# Patient Record
Sex: MALE | Race: WHITE
[De-identification: names, ages, dates, MRNs, and addresses within clinical notes are randomized per-mention and may not be internally consistent; named-entity substitution may affect disease eponyms.]

---

## 2017-07-12 ENCOUNTER — HOSPITAL ENCOUNTER (OUTPATIENT)
Dept: HOSPITAL 62 - CAR | Age: 27
End: 2017-07-12
Attending: FAMILY MEDICINE
Payer: MEDICAID

## 2017-07-12 DIAGNOSIS — K90.0: Primary | ICD-10-CM

## 2017-07-12 DIAGNOSIS — Z79.899: ICD-10-CM

## 2017-07-12 DIAGNOSIS — L30.9: ICD-10-CM

## 2017-07-12 LAB
ALBUMIN SERPL-MCNC: 4.5 G/DL (ref 3.5–5)
ALP SERPL-CCNC: 91 U/L (ref 38–126)
ALT SERPL-CCNC: 41 U/L (ref 21–72)
ANION GAP SERPL CALC-SCNC: 14 MMOL/L (ref 5–19)
AST SERPL-CCNC: 30 U/L (ref 17–59)
BASOPHILS # BLD AUTO: 0 10^3/UL (ref 0–0.2)
BASOPHILS NFR BLD AUTO: 1 % (ref 0–2)
BILIRUB DIRECT SERPL-MCNC: 0.3 MG/DL (ref 0–0.4)
BILIRUB SERPL-MCNC: 0.9 MG/DL (ref 0.2–1.3)
BUN SERPL-MCNC: 19 MG/DL (ref 7–20)
CALCIUM: 9.1 MG/DL (ref 8.4–10.2)
CHLORIDE SERPL-SCNC: 102 MMOL/L (ref 98–107)
CHOLEST SERPL-MCNC: 202.34 MG/DL (ref 0–200)
CO2 SERPL-SCNC: 28 MMOL/L (ref 22–30)
CREAT SERPL-MCNC: 0.85 MG/DL (ref 0.52–1.25)
DIRECT HDL: 43 MG/DL (ref 40–?)
EOSINOPHIL # BLD AUTO: 0 10^3/UL (ref 0–0.6)
EOSINOPHIL NFR BLD AUTO: 0.9 % (ref 0–6)
ERYTHROCYTE [DISTWIDTH] IN BLOOD BY AUTOMATED COUNT: 13.5 % (ref 11.5–14)
GLUCOSE SERPL-MCNC: 85 MG/DL (ref 75–110)
HCT VFR BLD CALC: 48.3 % (ref 37.9–51)
HGB BLD-MCNC: 15.8 G/DL (ref 13.5–17)
HGB HCT DIFFERENCE: -0.9
LDLC SERPL DIRECT ASSAY-MCNC: 148 MG/DL (ref ?–100)
LYMPHOCYTES # BLD AUTO: 1 10^3/UL (ref 0.5–4.7)
LYMPHOCYTES NFR BLD AUTO: 21.8 % (ref 13–45)
MCH RBC QN AUTO: 33.2 PG (ref 27–33.4)
MCHC RBC AUTO-ENTMCNC: 32.6 G/DL (ref 32–36)
MCV RBC AUTO: 102 FL (ref 80–97)
MONOCYTES # BLD AUTO: 0.5 10^3/UL (ref 0.1–1.4)
MONOCYTES NFR BLD AUTO: 11.1 % (ref 3–13)
NEUTROPHILS # BLD AUTO: 3 10^3/UL (ref 1.7–8.2)
NEUTS SEG NFR BLD AUTO: 65.2 % (ref 42–78)
POTASSIUM SERPL-SCNC: 4.5 MMOL/L (ref 3.6–5)
PROT SERPL-MCNC: 8.2 G/DL (ref 6.3–8.2)
RBC # BLD AUTO: 4.76 10^6/UL (ref 4.35–5.55)
SODIUM SERPL-SCNC: 144.1 MMOL/L (ref 137–145)
TRIGL SERPL-MCNC: 65 MG/DL (ref ?–150)
VLDLC SERPL CALC-MCNC: 13 MG/DL (ref 10–31)
WBC # BLD AUTO: 4.6 10^3/UL (ref 4–10.5)

## 2017-07-12 PROCEDURE — 85025 COMPLETE CBC W/AUTO DIFF WBC: CPT

## 2017-07-12 PROCEDURE — 84443 ASSAY THYROID STIM HORMONE: CPT

## 2017-07-12 PROCEDURE — 80061 LIPID PANEL: CPT

## 2017-07-12 PROCEDURE — 80053 COMPREHEN METABOLIC PANEL: CPT

## 2017-07-12 PROCEDURE — 82306 VITAMIN D 25 HYDROXY: CPT

## 2017-08-25 ENCOUNTER — HOSPITAL ENCOUNTER (OUTPATIENT)
Dept: HOSPITAL 62 - PC | Age: 27
End: 2017-08-25
Attending: PEDIATRICS
Payer: MEDICAID

## 2017-08-25 DIAGNOSIS — I34.0: Primary | ICD-10-CM

## 2017-08-25 DIAGNOSIS — Q90.9: ICD-10-CM

## 2017-08-25 PROCEDURE — 93005 ELECTROCARDIOGRAM TRACING: CPT

## 2017-08-25 PROCEDURE — 93325 DOPPLER ECHO COLOR FLOW MAPG: CPT

## 2017-08-25 PROCEDURE — 93320 DOPPLER ECHO COMPLETE: CPT

## 2017-08-25 PROCEDURE — 94760 N-INVAS EAR/PLS OXIMETRY 1: CPT

## 2017-08-25 PROCEDURE — 93303 ECHO TRANSTHORACIC: CPT

## 2017-08-25 PROCEDURE — 93010 ELECTROCARDIOGRAM REPORT: CPT

## 2017-08-25 NOTE — NONINVASIVE CARDIOLOGY REPORT
ECHOCARDIOGRAPHY REPORT



PATIENT NAME:  DUGLAS REBOLLEDO

MRN:  [id number]        ACCT#:  [id number]  ROOM#:

DATE OF SERVICE:  2017                    :  1990

PRIMARY CARE:  Dr. Leanna Reinoso

[facility] REFERENCE #:  [id number]

ORDER #:  [id number]

INDICATION:  Follow up of mitral regurgitation.



WEIGHT:  121 pounds

HEIGHT:  61 inches



REPORT

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

regurgitation but no left atrial enlargement.  Left ventricular size and

performance are normal.  LV wall thickness and septal thickness are

normal.  Aortic root normal.  Aortic valve trileaflet and normal.  No

endocardial cushion defect.  Atrial septum appears intact.  Aortic arch is

normal.



Doppler velocities are normal through the four cardiac valves.  Tricuspid

regurgitant velocity indicates no pulmonary hypertension.  Descending

aorta velocity normal.



Color mapping shows normal TR, normal TN, and the mild mitral

regurgitation.



CARDIAC DIMENSIONS:  LVED 4.7 cm, LVES 3.2 cm, LV wall 0.8 cm, septum 0.9

cm, aorta 2.8 cm, right ventricle 1.5 cm, left atrium 3.3 cm.



DOPPLER VELOCITIES:  Aorta 1.1 m/sec, pulmonary 0.7 m/sec, tricuspid 0.5

m/sec, mitral 1.2 m/sec, tricuspid regurgitation 2.2 m/sec, descending

aorta 1.1 m/sec.



FINAL IMPRESSION:  MILD MITRAL VALVE REGURGITATION THROUGH A CLEFT IN THE

ANTERIOR MITRAL LEAFLET WITHOUT CHANGE AND WITHOUT LEFT ATRIAL OR LEFT

VENTRICULAR ENLARGEMENT.



INTERPRETING PHYSICIAN: KEELY OLMEDO MD









/:  [id number]      DT:  2017 TT:  1424      ID:  [id number]

/:  [id number]      DD:  2017 TD:  1305     JOB:  [id number]



cc:MD LEANNA FELIX M.D.

>

## 2017-08-25 NOTE — [facility]



NAME: DUGLAS REBOLLEDO

MRN:  [id number]

[facility] REFERENCE #:  [id number]

:  1990

DATE OF VISIT:  2017



PRIMARY CARE:  Dr. Leanna Reinoso



CHIEF COMPLAINT:  Down syndrome with mitral valve disorder.



Patient returns for his two-year followup.  He has had mild mitral valve

regurgitation from a cleft in the anterior leaflet.  He has Down syndrome

and autism.  He lives at [facility].  He is here with his two [employer]

workers today.  They state that he is doing well.  He is always happy.  He

has no unusual respiratory or cardiac distress.  He does not have syncope

or seizures.



MEDICATIONS:  Allegra, vitamin D, omega 3 fatty acids, and Benofiber.



ALLERGIES TO MEDICATION:  PENICILLIN.



SOCIAL HISTORY:  Lives at [facility].



PAST MEDICAL HISTORY:  Down syndrome, surgery for Hirschsprung's disease

and intussusception and partial resection of small bowel in ,

tympanostomy tubes at age [age], inguinal herniorrhaphy .



REVIEW OF SYSTEMS:  Our 12-point review was negative for any symptoms.  He

does see the dentist at least twice a year and gets good checkups.



PHYSICAL EXAMINATION:  Weight 121 pounds, height 61 inches, oximetry 100%,

blood pressure 106/63, heart rate 65.  General exam is a happy and

reasonably cooperative boy with autism and Down syndrome.  He is fit

appearing and not obese.  Color and perfusion excellent.  Lungs clear

bilateral.  Precordial activity normal.  Cardiac auscultation reveals a

grade 2-3 holosystolic mitral regurgitation murmur with a quiet second

heart sound.  No gallop.  No diastolic murmur.  Abdomen shows scars from

his surgery, but is nontender.  Pulses are brisk.



Twelve-lead electrocardiogram is normal with all normal intervals. 

Echocardiogram performed.



IMPRESSION:  HE HAS MILD MITRAL VALVE REGURGITATION THROUGH A CLEFT IN THE

ANTERIOR MITRAL VALVE LEAFLET.  HE DOES NOT HAVE A TRUE AV CANAL.  HIS

LEFT VENTRICLE AND LEFT ATRIUM ARE NORMAL SIZE WITH NORMAL PERFORMANCE. 

He does not need antibiotic prophylaxis for dental cleanings.  He does not

need cardiac medications.  He does not require cardiac surgery at this

time.  Recommend a two-year cardiac echo and visit.



KEELY OLMEDO MD









[id number]                  DT: 2017    1323

PHY#: [id number]            DD: 2017    1303

ID:   [id number]           JOB#: [id number]       ACCT: [id number]



cc:MD LEANNA FELIX M.D.

>

## 2019-09-13 ENCOUNTER — HOSPITAL ENCOUNTER (OUTPATIENT)
Dept: HOSPITAL 62 - PC | Age: 29
End: 2019-09-13
Attending: PEDIATRICS
Payer: MEDICAID

## 2019-09-13 DIAGNOSIS — Z51.89: Primary | ICD-10-CM

## 2019-09-13 DIAGNOSIS — Q90.9: ICD-10-CM

## 2019-09-13 DIAGNOSIS — I34.0: ICD-10-CM

## 2019-09-13 PROCEDURE — 94760 N-INVAS EAR/PLS OXIMETRY 1: CPT

## 2019-09-13 PROCEDURE — 93325 DOPPLER ECHO COLOR FLOW MAPG: CPT

## 2019-09-13 PROCEDURE — 93304 ECHO TRANSTHORACIC: CPT

## 2019-09-13 PROCEDURE — 93321 DOPPLER ECHO F-UP/LMTD STD: CPT

## 2019-09-14 NOTE — PEDIATRIC CLINIC REPORT
Pediatric Cardiology Clinic


Pediatric Cardiology Clinic Note: 


[facility] Note [facility]


Date: Date of visit September 13, 2019


Reason for Visit/ Chief Complaint: Follow-up of mitral valve regurgitation 

associated with Down syndrome


Requesting Source: PCP: Physicians at [facility] in [place]; Dr Leanna Reinoso


Pediatric Cardiologist: Cullen Castorena MD, [facility] [employer] 

[facility] Pediatric Cardiology





History of Present Illness and Cardiology History: He is at our pediatric 

cardiology outreach clinic at [facility] with to the [facility] workers.  I

last saw him 2 years ago for his mild mitral valve regurgitation associated with

a cleft mitral valve.  He has Down syndrome.  He is doing well with his general 

physical health.  His only behavioral medications Intuniv 0.5 mg daily.  He is 

not on cardiac medications.  He does not require an asthma inhaler and simply 

has medication for seasonal allergies.


No cardiovascular symptoms.


No chest pain or palpitations. No respiratory complaints such as wheezing or 

apparent dyspnea. Denies exercise intolerance.


The medications list was reviewed with the patient.


Allergies were reviewed with the patient.


Allergies Reported: Penicillin allergy


Medical History: Down syndrome,Celiac disease, eczema, history of Hirschsprung 

disease,


Surgical History: Surgery for Hirschsprung's disease intussusception with 

partial resection of small bowel in 1999.  Tympanostomy tubes at age 2.  

Inguinal herniorrhaphy in 2019.





Social History:  He lives at [facility]





Review of Systems


General: Denies fevers, unusual sweats, anorexia, unusual fatigue, abnormal 

weight loss, developmental delays.


Eyes: Denies vision change or problems


Ears/Nose/Throat:Denies decreased hearing, or acute symptoms


Cardiovascular: see HPI


Respiratory:Denies cough, dyspnea, wheezing, snoring.


Gastrointestinal:Denies nausea, vomiting, diarrhea, constipation, abdominal 

pain.


Genitourinary:Denies dysuria, urinary frequency


GYN: Denies abnormal vaginal bleeding.


Musculoskeletal: Denies back pain, joint pain, or unusual joint laxity.


Skin: Denies rash


Neurologic: Denies seizures, syncope, or frequent headache.


Psychiatric: Denies complaints.


Endocrine: Denies symptoms or unusual weight change.


Heme/Lymphatic: Denies abnormal bruising, bleeding, enlarged lymph nodes.





Physical Exam


Vital Signs: Oximetry 100%


Weight: 131 pounds           height: 62 inches


Pulse rate: 75     respirations: 20


Blood Pressure: 110/70


Growth: appropriate, short stature of Down syndrome.


General appearance: alert, well nourished, well hydrated, no acute distress


Head: normocephalic with facial features of trisomy 21.


Eyes: conjunctivae and lids normal


Teeth/Gums/Palate: dentition and gums normal, no lesions


Oral mucosa: no pallor or cyanosis


Neck veins: no JVD


Thyroid: no enlargement


Lymphatic: no cervical adenopathy


Respiratory


Respiratory effort: comfortable breathing


Auscultation: no rales, rhonchi, or wheezes


Cardiovascular


Palpation: no thrill or palpable murmurs, no displacement of PMI


Auscultation: S1 normal, S2 normal intensity and splitting, no abnormal murmur, 

no gallop


Abdominal aorta: no enlargement or bruits


Carotid arteries: no carotid bruits


Femoral arteries: normal femoral pulses with no brachio-femoral delay


Pedal pulses:pulses 2+, symmetric


Periph. circulation: warm and pink, no cyanosis


Abdomen: soft, non-tender, no masses, bowel sounds normal


Liver and spleen: no enlargement


Back: no significant deformity


Skin Inspection: no abnormal lesions


Neurologic


Normal coordination and tone


Gait and station: normal


Muscle strength/tone: normal tone and strength


Mental Status Exam


Orientation: oriented to time, place, and person


Mood and affect:no depression, anxiety, or agitation


He has features of autism but he is really quite cooperative to simple commands.


Labs and Tests ordered   echocardiogram


Assessment and Plan: Mild mitral valve regurgitation related to cleft mitral 

valve which could represent a very mild form of AV septal defect.  There is no 

atrial or ventricular defect.  Cardiac function is normal.


Endocarditis prophylaxis indicated?  Not necessary we did discuss the importance

of his yearly dental checkups.


Special restrictions on activity?  Not necessary from cardiac standpoint.  2 

years


Follow up: 2 years


Information sheets or diagram of condition given.


I am grateful for this consultation. Cullen Castorena M.D.

## 2019-09-16 NOTE — PEDIATRIC ECHOCARDIOGRAM
Peds Echocardiography Report

 

[facility] at [facility]

Referring Physician: PCP: Leanna Reinoso MD 

Reading MD: Dr Cullen Castorena

Follow-up study

Indications: Down syndrome with cleft mitral valve

Study Date: September 13, 2019

Performed by: BEVERLY



[facility] IDX #[id number]



Weight 131 pounds

Height 62 inches



Two Dimensional Data (cm)

LV end diastolic dimension: 5.04

LV end systolic dimension: 3.29

LV posterior wall thickness diastolic: 0.84

Interventricular Septum diastolic thickness: 0.73

RV end diastolic dimension: 1.43

Aortic sinuses diameter: 2.7

Left atrial diameter long axis: 3.5

LV Ejection fraction (Teichholz method): 64%



Doppler Velocity Data (M/sec)

Aortic systolic: 1.1

Pulmonic systolic: 0.7

Mitral diastolic: 1.0

Tricuspid diastolic: 0.5

Additional Doppler data: Descending aorta: 1.0 



COLOR FLOW MAPPING: shows mild mitral valve regurgitation which is 4 mm wide at 
the cleft in the mitral valve.  No abnormal shunting. No abnormal turbulence 
through any of the valves antegrade.



Because the patient was somewhat uncooperative for twelve-lead EKG and EKG 
patches after the echo was done I was able to apply the EKG patches and run a 
hardcopy off of the echo machine for measuring EKG intervals which showed normal
ME interval of 160 ms a normal width of QRS complex and a QT interval of 390 ms 
heart rate of 66 or  ms all of which are normal



Comments: 

Pulmonary and systemic venous returns are normal.

Atrial situs solitus with normal atrioventricular and ventriculoarterial 
relationships.

Normal dimensional data.

Normal ventricular ejection performances.

Intact atrial septum.

Intact ventricular septum.

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 
4 mm wide by color mapping at the valve leaflets.

Normal valvar morphology and transvalvar velocities, with a normal LV filling 
pattern.

No pathologic valvar incompetence.

The coronary arteries appear to be normal in terms of origin, distribution, and 
caliber.

Normal left sided aortic arch.

No PDA

No abnormal pericardial fluid collection

Impression: 

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 
4 mm wide by color mapping at the valve leaflets.

No abnormal left ventricular enlargement.  Normal left ventricular systolic 
performance.

MTDD